# Patient Record
Sex: MALE | Race: AMERICAN INDIAN OR ALASKA NATIVE
[De-identification: names, ages, dates, MRNs, and addresses within clinical notes are randomized per-mention and may not be internally consistent; named-entity substitution may affect disease eponyms.]

---

## 2017-06-19 ENCOUNTER — HOSPITAL ENCOUNTER (OUTPATIENT)
Dept: HOSPITAL 31 - C.SDS | Age: 67
Discharge: HOME | End: 2017-06-19
Attending: UROLOGY
Payer: MEDICARE

## 2017-06-19 VITALS — BODY MASS INDEX: 25.8 KG/M2

## 2017-06-19 VITALS
HEART RATE: 59 BPM | SYSTOLIC BLOOD PRESSURE: 143 MMHG | TEMPERATURE: 98 F | DIASTOLIC BLOOD PRESSURE: 95 MMHG | OXYGEN SATURATION: 100 % | RESPIRATION RATE: 20 BRPM

## 2017-06-19 DIAGNOSIS — N39.0: ICD-10-CM

## 2017-06-19 DIAGNOSIS — N40.1: Primary | ICD-10-CM

## 2017-06-19 DIAGNOSIS — R39.14: ICD-10-CM

## 2017-06-19 PROCEDURE — 88104 CYTOPATH FL NONGYN SMEARS: CPT

## 2017-06-19 PROCEDURE — 87086 URINE CULTURE/COLONY COUNT: CPT

## 2017-06-19 PROCEDURE — 52000 CYSTOURETHROSCOPY: CPT

## 2017-06-19 PROCEDURE — 74430 CONTRAST X-RAY BLADDER: CPT

## 2017-06-19 NOTE — PCM.SURG1
Surgeon's Initial Post Op Note





- Surgeon's Notes


Surgeon: OTTO HAN


Assistant: NONE


Type of Anesthesia: General Mask


Pre-Operative Diagnosis: VOIDING DYSFUNCTION.  BPH


Operative Findings: SAME


Post-Operative Diagnosis: SAME


Operation Performed: CMG, CGM. CYSTOSCOPY


Specimen/Specimens Removed: URINE


Estimated Blood Loss: EBL {In ML}: 0


Blood Products Given: N/A


Drains Used: No Drains


Post-Op Condition: Good


Date of Surgery/Procedure: 06/19/17


Time of Surgery/Procedure: 10:10

## 2017-06-20 NOTE — RAD
PROCEDURE:  



HISTORY:

URINARY FREQUENCY



COMPARISON:

None



TECHNIQUE:

As per physician performing the exam



FINDINGS:

Contrast within a moderately distended bladder is noted wrote contour 

appears normal no gross filling defects seen



IMPRESSION:

As above

## 2017-06-20 NOTE — OP
PROCEDURE DATE: 06/19/2017



PREOPERATIVE DIAGNOSES:  Lower urinary tract voiding symptoms, prostatic enlargement.



POSTOPERATIVE DIAGNOSES:  Lower urinary tract voiding symptoms, prostatic enlargement.



PROCEDURE:  Cystometrogram.  Cystogram.  Cystoscopy.



DESCRIPTION OF PROCEDURE:  The patient received perioperative antibiotics.  The patient was placed in
 the supine position.  Akhtar catheter was inserted per urethra.  The residual within the bladder was 
less than 100 mL.  Urine was sent for bacteriologic examination and cytologic examination.



The cystometrogram was performed.  Carbon dioxide gas was instilled into the bladder at filling rate 
of 120 mL minute.



The patient had a first urge to void at a volume of 110 mL.  He had a stronger desire to void at 560 
mL.  The patient had felt uncomfortable and full at a volume of 200 mL.



There was no detrusor instability.  There was no detrusor contraction.  The only _____ pressure was w
hen patient was asked to cough.



The patient then had a cystogram performed.  Iodinated contrast dye was instilled per urethra.  Radio
graphs were obtained in PA and oblique views.



Cystogram demonstrated a normal bladder contour.  There was no intrinsic or extrinsic filling defect 
within the bladder.  There was no vesicoureteral reflux.  The bladder contour was normal.  There was 
mild elevation of the bladder base consistent with prostatic hypertrophy.



The bladder was then drained.  The patient was placed in lithotomy position.  Genitalia prepped and d
raped sterilely.  Anesthesia was provided by the anesthesiologist.



A 22-Portuguese cystoscope sheath was introduced under direct vision.  Urethra, prostate and bladder were
 inspected with 30-degree and 70-degree lenses.



FINDINGS:  There was no stricture of the anterior urethra.  There was evidence of predominant lateral
 lobe hypertrophy.  The prostatic urethra was 5 cm in length.  There was small middle lobe hypertroph
y at the bladder neck.



There was mild bladder trabeculation.  There was no bladder tumor.  There was no bladder stone.  The 
ureteral orifices were normal in position and shape.  There was no bladder diverticula.  There were n
o focal mucosal lesions within the bladder.



The bladder was reinspected with 70-degree lens and confirmed the above findings.



The cystoscope and sheath were removed.



Rectal examination was performed.  Prostate was supple and smooth, approximately 15 grams in size, wi
thout fixation, induration, or nodularity.  There was no abnormal pelvic mass fixation or induration.




The patient tolerated the procedure without complication.





__________________________________________

[place] EVANS Long MD







cc:



DD: 06/20/2017 06:23:52  [id number]

TT: 06/20/2017 11:48:13

Job # [id number]

rn

## 2018-11-10 ENCOUNTER — HOSPITAL ENCOUNTER (EMERGENCY)
Dept: HOSPITAL 31 - C.ER | Age: 68
Discharge: HOME | End: 2018-11-10
Payer: MEDICARE

## 2018-11-10 VITALS
DIASTOLIC BLOOD PRESSURE: 88 MMHG | HEART RATE: 81 BPM | SYSTOLIC BLOOD PRESSURE: 171 MMHG | TEMPERATURE: 97.8 F | OXYGEN SATURATION: 98 %

## 2018-11-10 VITALS — BODY MASS INDEX: 25.8 KG/M2

## 2018-11-10 VITALS — RESPIRATION RATE: 20 BRPM

## 2018-11-10 DIAGNOSIS — Z87.442: ICD-10-CM

## 2018-11-10 DIAGNOSIS — N18.9: ICD-10-CM

## 2018-11-10 DIAGNOSIS — N40.1: Primary | ICD-10-CM

## 2018-11-10 DIAGNOSIS — I12.9: ICD-10-CM

## 2018-11-10 DIAGNOSIS — R33.8: ICD-10-CM

## 2018-11-10 LAB
BILIRUB UR-MCNC: NEGATIVE MG/DL
GLUCOSE UR STRIP-MCNC: (no result) MG/DL
LEUKOCYTE ESTERASE UR-ACNC: (no result) LEU/UL
PH UR STRIP: 7 [PH] (ref 5–8)
PROT UR STRIP-MCNC: NEGATIVE MG/DL
RBC # UR STRIP: (no result) /UL
SP GR UR STRIP: 1.01 (ref 1–1.03)
SQUAMOUS EPITHIAL: < 1 /HPF (ref 0–5)
UROBILINOGEN UR-MCNC: NORMAL MG/DL (ref 0.2–1)

## 2018-11-10 NOTE — C.PDOC
History Of Present Illness





Patient c/o low abdominal pressure started from 11 am associated with inability 

to urinate. Patient sts he has h/o BPH and has scheduled surgery for 11/26/18 by

.


Chief Complaint (Nursing): Male Genitourinary


History Per: Patient


History/Exam Limitations: no limitations


Onset/Duration Of Symptoms: Days (1)


Current Symptoms Are (Timing): Still Present


Severity: Moderate


Quality Of Discomfort: Pressure





Past Medical History


Reviewed: Historical Data, Nursing Documentation, Vital Signs


Vital Signs: 





                                Last Vital Signs











Temp  97.8 F   11/10/18 03:00


 


Pulse  81   11/10/18 03:00


 


Resp  20   11/10/18 03:00


 


BP  171/88 H  11/10/18 03:00


 


Pulse Ox  98   11/10/18 03:00














- Medical History


PMH: Anxiety, Arthritis, HTN, Kidney Stones, Chronic Kidney Disease


Other PMH: BPH


Family History: States: No Known Family Hx





- Social History


Hx Alcohol Use: Yes


Hx Substance Use: No





- Immunization History


Hx Tetanus Toxoid Vaccination: No


Hx Influenza Vaccination: No


Hx Pneumococcal Vaccination: No





Review Of Systems


Except As Marked, All Systems Reviewed And Found Negative.





Physical Exam





- Physical Exam


Appears: Non-toxic, No Acute Distress, Other (uncomfortable)


Skin: Normal Color, Warm, No Rash


Head: Atraumatic, Normacephalic


Eye(s): bilateral: Normal Inspection


Chest: Symmetrical


Cardiovascular: Rhythm Regular


Respiratory: Normal Breath Sounds, No Accessory Muscle Use


Gastrointestinal/Abdominal: Tenderness (low abdomen), Distention (suprapubic 

area)


Extremity: Normal ROM, No Pedal Edema


Neurological/Psych: Oriented x3, Normal Cognition, Normal Motor, Normal 

Sensation





ED Course And Treatment


O2 Sat by Pulse Oximetry: 98


Progress Note: Akhtar was inserted by RN, obtained 500 ml of clear urine that was

sent for UA. Patient sts he feels much better. UA with few RBCs (probably 

traumatic from Akhtar insertion). No signs of UTI. Patient is stable to be d/c 

home with leg bag with  follow up.





Disposition





- Disposition


Referrals: 


Darwin Long MD [Staff Provider] - 


Aubree Long MD [Staff Provider] - 


Disposition: HOME/ ROUTINE


Disposition Time: 03:05


Condition: IMPROVED


Additional Instructions: 


Follow up with your Urologist  within 2-3 days. Return to Ed if feel 

worse.


Instructions:  Urinary Retention (DC)


Forms:  [facility] (English)





- Clinical Impression


Clinical Impression: 


 Urinary retention

## 2018-11-26 ENCOUNTER — HOSPITAL ENCOUNTER (INPATIENT)
Dept: HOSPITAL 31 - C.SDS | Age: 68
LOS: 3 days | Discharge: HOME | DRG: 713 | End: 2018-11-29
Attending: INTERNAL MEDICINE | Admitting: INTERNAL MEDICINE
Payer: MEDICARE

## 2018-11-26 VITALS — BODY MASS INDEX: 21.7 KG/M2

## 2018-11-26 DIAGNOSIS — F17.210: ICD-10-CM

## 2018-11-26 DIAGNOSIS — E11.22: ICD-10-CM

## 2018-11-26 DIAGNOSIS — I12.9: ICD-10-CM

## 2018-11-26 DIAGNOSIS — N18.9: ICD-10-CM

## 2018-11-26 DIAGNOSIS — N13.8: ICD-10-CM

## 2018-11-26 DIAGNOSIS — N40.1: Primary | ICD-10-CM

## 2018-11-26 DIAGNOSIS — Z79.4: ICD-10-CM

## 2018-11-26 LAB
BUN SERPL-MCNC: 18 MG/DL (ref 9–20)
CALCIUM SERPL-MCNC: 8.7 MG/DL (ref 8.6–10.4)
GFR NON-AFRICAN AMERICAN: > 60

## 2018-11-26 PROCEDURE — 0TJB8ZZ INSPECTION OF BLADDER, VIA NATURAL OR ARTIFICIAL OPENING ENDOSCOPIC: ICD-10-PCS | Performed by: UROLOGY

## 2018-11-26 PROCEDURE — 0VT08ZZ RESECTION OF PROSTATE, VIA NATURAL OR ARTIFICIAL OPENING ENDOSCOPIC: ICD-10-PCS | Performed by: UROLOGY

## 2018-11-26 RX ADMIN — OXYCODONE HYDROCHLORIDE AND ACETAMINOPHEN PRN TAB: 5; 325 TABLET ORAL at 18:00

## 2018-11-26 RX ADMIN — HYDROMORPHONE HYDROCHLORIDE PRN MG: 1 INJECTION, SOLUTION INTRAMUSCULAR; INTRAVENOUS; SUBCUTANEOUS at 15:36

## 2018-11-26 RX ADMIN — POTASSIUM CHLORIDE, DEXTROSE MONOHYDRATE AND SODIUM CHLORIDE SCH MLS: 150; 5; 450 INJECTION, SOLUTION INTRAVENOUS at 15:15

## 2018-11-26 RX ADMIN — INSULIN ASPART SCH: 100 INJECTION, SOLUTION INTRAVENOUS; SUBCUTANEOUS at 21:59

## 2018-11-26 RX ADMIN — HYDROMORPHONE HYDROCHLORIDE PRN MG: 1 INJECTION, SOLUTION INTRAMUSCULAR; INTRAVENOUS; SUBCUTANEOUS at 10:47

## 2018-11-26 RX ADMIN — HYDROMORPHONE HYDROCHLORIDE PRN MG: 1 INJECTION, SOLUTION INTRAMUSCULAR; INTRAVENOUS; SUBCUTANEOUS at 09:45

## 2018-11-26 RX ADMIN — POTASSIUM CHLORIDE, DEXTROSE MONOHYDRATE AND SODIUM CHLORIDE SCH: 150; 5; 450 INJECTION, SOLUTION INTRAVENOUS at 22:37

## 2018-11-26 RX ADMIN — INSULIN ASPART SCH UNIT: 100 INJECTION, SOLUTION INTRAVENOUS; SUBCUTANEOUS at 18:55

## 2018-11-26 NOTE — CP.PCM.HP
Past Patient History





- Infectious Disease


Hx of Infectious Diseases: None





- Past Medical History & Family History


Past Medical History?: Yes





- Past Social History


Smoking Status: Light Smoker < 10 Cigarettes Daily





- CARDIAC


Hx Cardiac Disorders: Yes


Hx Hypertension: Yes





- PULMONARY


Hx Respiratory Disorders: No





- NEUROLOGICAL


Hx Neurological Disorder: No





- HEENT


Hx HEENT Problems: No





- RENAL


Hx Chronic Kidney Disease: Yes


Hx Kidney Stones: Yes





- ENDOCRINE/METABOLIC


Hx Endocrine Disorders: Yes


Hx Diabetes Mellitus Type 2: Yes





- HEMATOLOGICAL/ONCOLOGICAL


Hx Blood Disorders: No





- INTEGUMENTARY


Hx Dermatological Problems: Yes (ITCHING/DERMATITIS LEFT ANKLE)





- MUSCULOSKELETAL/RHEUMATOLOGICAL


Hx Musculoskeletal Disorders: Yes (PAIN RT KNEE)


Hx Arthritis: Yes





- GASTROINTESTINAL


Other/Comment: ABD DISTENDED BUT SOFT NO PAIN





- GENITOURINARY/GYNECOLOGICAL


Hx Genitourinary Disorders: Yes (RETENTION)


Hx Prostate Problems: Yes


Hx Urinary Tract Infection: Yes





- PSYCHIATRIC


Hx Psychophysiologic Disorder: Yes


Hx Anxiety: Yes


Hx Substance Use: No





- SURGICAL HISTORY


Hx Surgeries: No


Other/Comment: CYSTO ? [facility]





- ANESTHESIA


Hx Anesthesia: Yes


Hx Anesthesia Reactions: No


Hx Malignant Hyperthermia: No


Has any member of the family had a problem w/ anesthesia?: No





Meds


Allergies/Adverse Reactions: 


                                    Allergies











Allergy/AdvReac Type Severity Reaction Status Date / Time


 


No Known Allergies Allergy   Verified 11/10/18 01:29














Physical Exam





- Constitutional


Appears: Well





- Head Exam


Head Exam: ATRAUMATIC, NORMAL INSPECTION, NORMOCEPHALIC





- Eye Exam


Eye Exam: EOMI, Normal appearance, PERRL


Pupil Exam: NORMAL ACCOMODATION, PERRL





- ENT Exam


ENT Exam: Mucous Membranes Moist, Normal Exam





- Neck Exam


Neck exam: Positive for: Normal Inspection





- Respiratory Exam


Respiratory Exam: Decreased Breath Sounds





- Cardiovascular Exam


Cardiovascular Exam: REGULAR RHYTHM, +S1, +S2





- GI/Abdominal Exam


GI & Abdominal Exam: Diminished Bowel Sounds, Soft





- Rectal Exam


Rectal Exam: Deferred





Results





- Vital Signs


Recent Vital Signs: 





                                Last Vital Signs











Temp  98 F   11/26/18 21:00


 


Pulse  72   11/26/18 21:00


 


Resp  20   11/26/18 21:00


 


BP  169/89 H  11/26/18 21:00


 


Pulse Ox  98   11/26/18 21:00














- Labs


Result Diagrams: 


                                 11/26/18 07:41


Labs: 





                         Laboratory Results - last 24 hr











  11/26/18 11/26/18 11/26/18





  07:05 07:41 11:32


 


Sodium   138 


 


Potassium   4.3 


 


Chloride   104 


 


Carbon Dioxide   25 


 


Anion Gap   14 


 


BUN   18 


 


Creatinine   1.0 


 


Est GFR ( Amer)   > 60 


 


Est GFR (Non-Af Amer)   > 60 


 


POC Glucose (mg/dL)  131 H   149 H


 


Random Glucose   150 H 


 


Calcium   8.7 














  11/26/18 11/26/18





  16:46 21:25


 


Sodium  


 


Potassium  


 


Chloride  


 


Carbon Dioxide  


 


Anion Gap  


 


BUN  


 


Creatinine  


 


Est GFR ( Amer)  


 


Est GFR (Non-Af Amer)  


 


POC Glucose (mg/dL)  303 H  165 H


 


Random Glucose  


 


Calcium

## 2018-11-26 NOTE — PCM.SURG1
Surgeon's Initial Post Op Note





- Surgeon's Notes


Surgeon: OTTO Long


Assistant: none


Type of Anesthesia: General LMA


Pre-Operative Diagnosis: BPH


Operative Findings: same


Post-Operative Diagnosis: same


Operation Performed: cysto,.  TURP


Specimen/Specimens Removed: urine.  prostate


Estimated Blood Loss: EBL {In ML}: 100


Blood Products Given: N/A


Post-Op Condition: Good


Date of Surgery/Procedure: 11/26/18


Time of Surgery/Procedure: 09:30

## 2018-11-27 LAB
BUN SERPL-MCNC: 13 MG/DL (ref 9–20)
CALCIUM SERPL-MCNC: 8 MG/DL (ref 8.6–10.4)
ERYTHROCYTE [DISTWIDTH] IN BLOOD BY AUTOMATED COUNT: 13.6 % (ref 11.5–14.5)
GFR NON-AFRICAN AMERICAN: > 60
HGB BLD-MCNC: 12.8 G/DL (ref 12–18)
MCH RBC QN AUTO: 31.8 PG (ref 27–31)
MCHC RBC AUTO-ENTMCNC: 34.5 G/DL (ref 33–37)
MCV RBC AUTO: 92.2 FL (ref 80–94)
PLATELET # BLD: 199 K/UL (ref 130–400)
PMV BLD AUTO: 8.6 FL (ref 7.2–11.7)
RBC # BLD AUTO: 4.02 MIL/UL (ref 4.4–5.9)
WBC # BLD AUTO: 6.8 K/UL (ref 4.8–10.8)

## 2018-11-27 RX ADMIN — INSULIN ASPART SCH UNIT: 100 INJECTION, SOLUTION INTRAVENOUS; SUBCUTANEOUS at 12:09

## 2018-11-27 RX ADMIN — OXYCODONE HYDROCHLORIDE AND ACETAMINOPHEN PRN TAB: 5; 325 TABLET ORAL at 04:12

## 2018-11-27 RX ADMIN — OXYCODONE HYDROCHLORIDE AND ACETAMINOPHEN PRN TAB: 5; 325 TABLET ORAL at 00:30

## 2018-11-27 RX ADMIN — OXYCODONE HYDROCHLORIDE AND ACETAMINOPHEN PRN TAB: 5; 325 TABLET ORAL at 07:57

## 2018-11-27 RX ADMIN — INSULIN ASPART SCH: 100 INJECTION, SOLUTION INTRAVENOUS; SUBCUTANEOUS at 16:35

## 2018-11-27 RX ADMIN — OXYCODONE HYDROCHLORIDE AND ACETAMINOPHEN PRN TAB: 5; 325 TABLET ORAL at 13:43

## 2018-11-27 RX ADMIN — INSULIN ASPART SCH UNIT: 100 INJECTION, SOLUTION INTRAVENOUS; SUBCUTANEOUS at 08:20

## 2018-11-27 RX ADMIN — OXYCODONE HYDROCHLORIDE AND ACETAMINOPHEN PRN TAB: 5; 325 TABLET ORAL at 18:21

## 2018-11-27 RX ADMIN — INSULIN ASPART SCH: 100 INJECTION, SOLUTION INTRAVENOUS; SUBCUTANEOUS at 21:27

## 2018-11-27 RX ADMIN — POTASSIUM CHLORIDE, DEXTROSE MONOHYDRATE AND SODIUM CHLORIDE SCH MLS/HR: 150; 5; 450 INJECTION, SOLUTION INTRAVENOUS at 05:48

## 2018-11-27 NOTE — OP
PROCEDURE DATE:  11/26/2018



PREOPERATIVE DIAGNOSES:

1.  Benign prostatic hypertrophy.

2.  Bladder outlet obstruction.

3.  Enlarged prostate.



POSTOPERATIVE DIAGNOSES:

1.  Benign prostatic hypertrophy.

2.  Bladder outlet obstruction.

3.  Enlarged prostate.



PROCEDURES:

1.  Cystoscopy.

2.  Transurethral resection of the prostate.



OPERATING SURGEON:  Aubree Long MD



DESCRIPTION OF PROCEDURE:  As follows:  The patient was placed in lithotomy

position.  The patient received general anesthesia.  The genitalia were

prepped and draped sterilely.  Perioperative antibiotics were administered.



A 26-Kazakh continuous flow resectoscope sheath was introduced under direct

vision.  Procedure was performed under video endoscopic control.



The urethra, prostate, and bladder were inspected with 30-degree lens.



FINDINGS:  There was no stricture in the anterior urethra.  There was

evidence of trilobar prostatic hypertrophy.  There was predominant lateral

lobe hypertrophy.  Prostatic urethra length was 4.5 cm.  There was a small

intravesical middle lobe as well.  There was moderate bladder

trabeculation.  The ureteral orifices were at first not seen due to middle

lobe hypertrophy.  Subsequent inspection identified the ureteral orifices

bilaterally.



The resectoscope was inserted.



Resection of the prostate was performed as follows.  The middle lobe at the

bladder neck was resected.  The ureteral orifices were identified and

spared throughout the procedure.



Hemostasis was achieved after each section of resection.



Thereafter, the anterior roof tissue was resected.  The landmarks of the

veru were identified and preserved throughout the resection.



Subsequently, the lateral lobes were resected, first on the right, then on

the left side.  Finally, the floor and apical prostatic tissue were

resected with the surgeon's index finger within the [employer] drape in the

rectum.



The prostatic chips were removed using the [employer] evacuator. 

The resectoscope was inserted.  Hemostasis was achieved.  There were no

residual prostatic chips.



The resectoscope and sheath were removed.  Akhtar catheter was inserted. 

Bladder drainage was clear with mild traction applied.



The patient was returned to supine position.



The patient tolerated the procedure without complication.  The patient was

transferred to recovery room in satisfactory condition.



__________________________________________

Aubree Long MD





cc:  Tam Cai MD



DD:  11/27/2018 6:52:17

DT:  11/27/2018 6:55:18

Job # [id number]

## 2018-11-27 NOTE — PCM.URO
Urology Progress Note





- General


General: Tolerating Diet





- Subjective


Abdominal Pain: No


Flank Pain: No


Nausea: No


Vomiting: No


Hematuria: Yes (mild, clears promptly)


Dsypnea: No


Chest Pain: No


Fever & Chills: No





- Objective


Lab Studies: Reviewed


Lab Results Last 24 Hours: 


                         Laboratory Results - last 24 hr











  11/26/18 11/26/18 11/26/18





  11:32 16:46 21:25


 


WBC   


 


RBC   


 


Hgb   


 


Hct   


 


MCV   


 


MCH   


 


MCHC   


 


RDW   


 


Plt Count   


 


MPV   


 


Sodium   


 


Potassium   


 


Chloride   


 


Carbon Dioxide   


 


Anion Gap   


 


BUN   


 


Creatinine   


 


Est GFR ( Amer)   


 


Est GFR (Non-Af Amer)   


 


POC Glucose (mg/dL)  149 H  303 H  165 H


 


Random Glucose   


 


Calcium   














  11/27/18 11/27/18 11/27/18





  06:33 06:44 06:44


 


WBC   6.8 


 


RBC   4.02 L 


 


Hgb   12.8 


 


Hct   37.1 


 


MCV   92.2 


 


MCH   31.8 H 


 


MCHC   34.5 


 


RDW   13.6 


 


Plt Count   199 


 


MPV   8.6 


 


Sodium    136


 


Potassium    4.1


 


Chloride    103


 


Carbon Dioxide    25


 


Anion Gap    11


 


BUN    13


 


Creatinine    1.0


 


Est GFR ( Amer)    > 60


 


Est GFR (Non-Af Amer)    > 60


 


POC Glucose (mg/dL)  154 H  


 


Random Glucose    155 H


 


Calcium    8.0 L











Intake & Output: 


                                 Intake & Output











 11/26/18 11/27/18 11/27/18





 18:59 06:59 18:59


 


Intake Total 1204 3450 


 


Output Total 600 2200 


 


Balance 604 1250 


 


Intake:   


 


  IV 1204  


 


  Intake, IV Amount  200 


 


    Left Antecubital  200 


 


  Oral  250 


 


  Other  3000 


 


Output:   


 


  Urine 600 2200 


 


    3-way Urethral  700 


 


Other:   


 


  Voiding Method  3-way Akhtar with CBI 











Vital Signs: 


                               Vital Signs - 24 hr











  11/26/18 11/26/18 11/26/18





  10:45 11:15 11:45


 


Temperature   


 


Pulse Rate 66 64 62


 


Pulse Rate [   





Radial]   


 


Respiratory 13 12 15





Rate   


 


Blood Pressure 140/88 139/84 


 


O2 Sat by Pulse 100 100 100





Oximetry   














  11/26/18 11/26/18 11/26/18





  12:30 13:00 14:00


 


Temperature   


 


Pulse Rate 60 62 62


 


Pulse Rate [   





Radial]   


 


Respiratory 12 11 L 12





Rate   


 


Blood Pressure   147/84


 


O2 Sat by Pulse 100 100 100





Oximetry   














  11/26/18 11/26/18 11/26/18





  15:00 15:36 16:00


 


Temperature   


 


Pulse Rate 62 60 60


 


Pulse Rate [   





Radial]   


 


Respiratory 13 13 12





Rate   


 


Blood Pressure 131/79  149/83


 


O2 Sat by Pulse 100 100 100





Oximetry   














  11/26/18 11/26/18 11/26/18





  18:00 18:30 19:00


 


Temperature   


 


Pulse Rate 60 65 63


 


Pulse Rate [   





Radial]   


 


Respiratory 12 12 14





Rate   


 


Blood Pressure 161/85 H 169/98 H 138/83


 


O2 Sat by Pulse 100 100 100





Oximetry   














  11/26/18 11/26/18 11/26/18





  20:15 21:00 23:35


 


Temperature 99 F 98 F 98 F


 


Pulse Rate 69 72 74


 


Pulse Rate [  72 





Radial]   


 


Respiratory 14 20 20





Rate   


 


Blood Pressure 137/81 169/89 H 192/100 H


 


O2 Sat by Pulse 100 98 95





Oximetry   














  11/27/18 11/27/18 11/27/18





  03:30 04:30 07:00


 


Temperature  97.7 F 97.9 F


 


Pulse Rate 76 64 87


 


Pulse Rate [   





Radial]   


 


Respiratory  20 20





Rate   


 


Blood Pressure 175/89 H 146/81 197/101 H


 


O2 Sat by Pulse  97 97





Oximetry   














- Physical Exam


Abdominal Exam: Soft, Non-Tender, Non-Distended


Back: No CVA Tenderness


Genitalia: Without Inflammation


Urinary Catheter Draining Well: Yes


Urine Color: Pink


Extremities: Normal: Bilateral





- Male


Phallus: Normal


Scrotum: Normal


Testes: Normal: Bilateral





- Plan


Advance Diet: Yes


Catheter Care: Yes


Ambulation - Out of Bed: Yes


Intake & Output: Yes


See Orders: Yes


Additional Information: IMP:  progressing well, p TURP





- Date & Time of Note


Date: 11/27/18


Time: 08:10

## 2018-11-27 NOTE — CP.PCM.PN
Subjective





- Date & Time of Evaluation


Date of Evaluation: 11/27/18


Time of Evaluation: 10:00





- Subjective


Subjective: 


clinically same





Objective





- Vital Signs/Intake and Output


Vital Signs (last 24 hours): 


                                        











Temp Pulse Resp BP Pulse Ox


 


 97.9 F   87   20   197/101 H  97 


 


 11/27/18 07:00  11/27/18 07:00  11/27/18 07:00  11/27/18 07:00  11/27/18 07:00








Intake and Output: 


                                        











 11/27/18 11/27/18





 06:59 18:59


 


Intake Total 3450 


 


Output Total 2200 


 


Balance 1250 














- Medications


Medications: 


                               Current Medications





Docusate Sodium (Colace)  100 mg PO TID [facility]


   Last Admin: 11/27/18 09:51 Dose:  100 mg


Ceftriaxone Sodium 1 gm/ (Sodium Chloride)  100 mls @ 100 mls/hr IVPB DAILY [facility];

Protocol


   Last Admin: 11/27/18 09:58 Dose:  100 mls/hr


Influenza Virus Vaccine (Fluzone Quad [id number])  60 mcg IM .ONCE ONE


   Stop: 11/28/18 10:01


Insulin Aspart (Novolog)  0 unit SC ACHS [facility]; Protocol


   Last Admin: 11/27/18 12:09 Dose:  2 unit


Labetalol HCl (Trandate)  5 mg IV Q15MIN [facility]


   Stop: 11/30/18 09:53


Losartan Potassium (Cozaar)  100 mg PO DAILY [facility]


   Last Admin: 11/27/18 09:51 Dose:  100 mg


Oxycodone/Acetaminophen (Percocet 5/325 Mg Tab)  1 tab PO Q4H PRN


   PRN Reason: Pain, moderate (4-7)


   Stop: 11/29/18 10:59


   Last Admin: 11/27/18 07:57 Dose:  1 tab


Pneumococcal Polyvalent Vaccine (Pneumovax 23 Vaccine)  0.5 ml IM .ONCE ONE


   Stop: 11/28/18 10:01


Tamsulosin HCl (Flomax)  0.4 mg PO BID [facility]


   Last Admin: 11/27/18 09:51 Dose:  0.4 mg











- Labs


Labs: 


                                        





                                 11/27/18 06:44 





                                 11/27/18 06:44 











- Constitutional


Appears: Well





- Head Exam


Head Exam: ATRAUMATIC, NORMAL INSPECTION, NORMOCEPHALIC





- Eye Exam


Eye Exam: EOMI, Normal appearance, PERRL


Pupil Exam: NORMAL ACCOMODATION, PERRL





- ENT Exam


ENT Exam: Mucous Membranes Moist, Normal Exam





- Neck Exam


Neck Exam: Full ROM, Normal Inspection.  absent: Lymphadenopathy





- Respiratory Exam


Respiratory Exam: Decreased Breath Sounds





- Cardiovascular Exam


Cardiovascular Exam: REGULAR RHYTHM, +S1, +S2





- GI/Abdominal Exam


GI & Abdominal Exam: Soft, Diminished Bowel Sounds





- Rectal Exam


Rectal Exam: Deferred

## 2018-11-28 VITALS — RESPIRATION RATE: 20 BRPM

## 2018-11-28 RX ADMIN — INSULIN ASPART SCH: 100 INJECTION, SOLUTION INTRAVENOUS; SUBCUTANEOUS at 21:22

## 2018-11-28 RX ADMIN — OXYCODONE HYDROCHLORIDE AND ACETAMINOPHEN PRN TAB: 5; 325 TABLET ORAL at 18:21

## 2018-11-28 RX ADMIN — MULTIPLE VITAMINS W/ MINERALS TAB SCH TAB: TAB at 09:34

## 2018-11-28 RX ADMIN — INSULIN ASPART SCH UNIT: 100 INJECTION, SOLUTION INTRAVENOUS; SUBCUTANEOUS at 12:00

## 2018-11-28 RX ADMIN — INSULIN ASPART SCH: 100 INJECTION, SOLUTION INTRAVENOUS; SUBCUTANEOUS at 16:19

## 2018-11-28 RX ADMIN — INSULIN ASPART SCH: 100 INJECTION, SOLUTION INTRAVENOUS; SUBCUTANEOUS at 07:30

## 2018-11-28 RX ADMIN — OXYCODONE HYDROCHLORIDE AND ACETAMINOPHEN PRN TAB: 5; 325 TABLET ORAL at 02:37

## 2018-11-28 NOTE — CP.PCM.PN
Subjective





- Date & Time of Evaluation


Date of Evaluation: 11/28/18


Time of Evaluation: 09:10





- Subjective


Subjective: 


clinically same





Objective





- Vital Signs/Intake and Output


Vital Signs (last 24 hours): 


                                        











Temp Pulse Resp BP Pulse Ox


 


 98.6 F   80   20   130/74   97 


 


 11/28/18 15:34  11/28/18 15:34  11/28/18 15:34  11/28/18 15:34  11/28/18 15:34








Intake and Output: 


                                        











 11/28/18 11/29/18





 18:59 06:59


 


Intake Total 550 


 


Output Total 1000 


 


Balance -450 














- Medications


Medications: 


                               Current Medications





Ciprofloxacin (Cipro)  500 mg PO BID Pending [facility] to [facility]; Protocol


   Last Admin: 11/28/18 21:58 Dose:  500 mg


Docusate Sodium (Colace)  100 mg PO TID [facility] to [facility]


   Last Admin: 11/28/18 17:00 Dose:  100 mg


Insulin Aspart (Novolog)  0 unit SC ACHS [facility] to [facility]; Protocol


   Last Admin: 11/28/18 21:22 Dose:  Not Given


Labetalol HCl (Trandate)  5 mg IV Q15MIN [facility] to [facility]


   Stop: 11/30/18 09:53


Losartan Potassium (Cozaar)  100 mg PO DAILY [facility] to [facility]


   Last Admin: 11/28/18 09:34 Dose:  100 mg


Multivitamins/Minerals (Therapeutic-M Tab)  1 tab PO 0800 [facility]ending [facility] to [facility]


   Last Admin: 11/28/18 09:34 Dose:  1 tab


Oxycodone/Acetaminophen (Percocet 5/325 Mg Tab)  1 tab PO Q4H PRN


   PRN Reason: Pain, moderate (4-7)


   Stop: 11/29/18 10:59


   Last Admin: 11/28/18 18:21 Dose:  1 tab


Tamsulosin HCl (Flomax)  0.4 mg PO BID Pending [facility] to [facility]


   Last Admin: 11/28/18 17:00 Dose:  0.4 mg











- Labs


Labs: 


                                        





                                 11/27/18 06:44 





                                 11/27/18 06:44 











- Constitutional


Appears: Well





- Head Exam


Head Exam: ATRAUMATIC, NORMAL INSPECTION, NORMOCEPHALIC





- Eye Exam


Eye Exam: EOMI, Normal appearance, PERRL


Pupil Exam: NORMAL ACCOMODATION, PERRL





- ENT Exam


ENT Exam: Mucous Membranes Moist, Normal Exam





- Neck Exam


Neck Exam: Full ROM, Normal Inspection.  absent: Lymphadenopathy





- Respiratory Exam


Respiratory Exam: Decreased Breath Sounds





- Cardiovascular Exam


Cardiovascular Exam: REGULAR RHYTHM, +S1, +S2





- GI/Abdominal Exam


GI & Abdominal Exam: Soft, Diminished Bowel Sounds





- Rectal Exam


Rectal Exam: Deferred

## 2018-11-29 VITALS — HEART RATE: 77 BPM | SYSTOLIC BLOOD PRESSURE: 132 MMHG | DIASTOLIC BLOOD PRESSURE: 82 MMHG

## 2018-11-29 VITALS — OXYGEN SATURATION: 98 % | TEMPERATURE: 97.8 F

## 2018-11-29 RX ADMIN — MULTIPLE VITAMINS W/ MINERALS TAB SCH TAB: TAB at 09:00

## 2018-11-29 RX ADMIN — INSULIN ASPART SCH UNIT: 100 INJECTION, SOLUTION INTRAVENOUS; SUBCUTANEOUS at 12:21

## 2018-11-29 RX ADMIN — OXYCODONE HYDROCHLORIDE AND ACETAMINOPHEN PRN TAB: 5; 325 TABLET ORAL at 05:33

## 2018-11-29 RX ADMIN — OXYCODONE HYDROCHLORIDE AND ACETAMINOPHEN PRN TAB: 5; 325 TABLET ORAL at 00:00

## 2018-11-29 RX ADMIN — INSULIN ASPART SCH: 100 INJECTION, SOLUTION INTRAVENOUS; SUBCUTANEOUS at 07:44

## 2018-11-29 NOTE — CP.PCM.PN
Subjective





- Date & Time of Evaluation


Date of Evaluation: 11/29/18


Time of Evaluation: 08:00





- Subjective


Subjective: 





Medicine progress note: Dr. Yue Heredia's service





Patient was seen and examined at bedside in the AM.  Patient states he has slig

ht burning when the urine is being released but otherwise is feeling well.  

Patient denies chest pain, shortness of breath, nausea, vomiting, diarrhea, 

constipation, hematuria, abdominal pain or headache. 





Objective





- Vital Signs/Intake and Output


Vital Signs (last 24 hours): 


                                        











Temp Pulse Resp BP Pulse Ox


 


 97.8 F   77   20   132/82   98 


 


 11/29/18 07:07  11/29/18 09:30  11/29/18 07:07  11/29/18 09:30  11/29/18 07:07








Intake and Output: 


                                        











 11/29/18 11/29/18





 06:59 18:59


 


Intake Total 500 


 


Output Total 1100 


 


Balance -600 














- Medications


Medications: 


                               Current Medications





Ciprofloxacin (Cipro)  500 mg PO BID [facility]; Protocol


   Last Admin: 11/29/18 09:33 Dose:  500 mg


Docusate Sodium (Colace)  100 mg PO TID [facility]


   Last Admin: 11/29/18 09:32 Dose:  100 mg


Insulin Aspart (Novolog)  0 unit SC [facility]; Protocol


   Last Admin: 11/29/18 07:44 Dose:  Not Given


Labetalol HCl (Trandate)  5 mg IV Q15MIN [facility]


   Stop: 11/30/18 09:53


Losartan Potassium (Cozaar)  100 mg PO DAILY [facility]


   Last Admin: 11/29/18 09:33 Dose:  100 mg


Multivitamins/Minerals (Therapeutic-M Tab)  1 tab PO 0800 [facility]


   Last Admin: 11/29/18 09:00 Dose:  1 tab


Tamsulosin HCl (Flomax)  0.4 mg PO BID [facility]


   Last Admin: 11/29/18 09:32 Dose:  0.4 mg











- Labs


Labs: 


                                        





                                 11/27/18 06:44 





                                 11/27/18 06:44 











- Constitutional


Appears: No Acute Distress





- Head Exam


Head Exam: ATRAUMATIC, NORMAL INSPECTION





- Eye Exam


Eye Exam: EOMI, Normal appearance





- ENT Exam


ENT Exam: Mucous Membranes Moist





- Respiratory Exam


Respiratory Exam: Clear to Ausculation Bilateral, NORMAL BREATHING PATTERN





- Cardiovascular Exam


Cardiovascular Exam: REGULAR RHYTHM, +S1, +S2





- GI/Abdominal Exam


GI & Abdominal Exam: Soft, Normal Bowel Sounds.  absent: Tenderness





-  Exam


Additional comments: 





enciso in place - urine - clear/yellow in color





- Extremities Exam


Extremities Exam: Normal Inspection





- Neurological Exam


Neurological Exam: Alert, Awake, Oriented x3





- Psychiatric Exam


Psychiatric exam: Normal Affect





- Skin


Skin Exam: Normal Color





Assessment and Plan





- Assessment and Plan (Free Text)


Assessment: 





BPH/Bladder Outlet Obstruction


- Uro Consult: Dr. Aubree Long --> help appreciated


   - s/p cystoscopy and transurethral resection of the prostate


- Discussed with Dr. Long - stated the burning is normal. Patient to be 

discharged with Percocet as needed for pain and Levaquin for 10 days.


Discussed with patient that he will go home with the enciso and is to follow up 

with Dr. Long in the office on Tuesday December 4th to have the enciso 

removed.  Patient stated he understood.





Case discussed with Dr. DANUTA Heredia

## 2019-01-31 ENCOUNTER — HOSPITAL ENCOUNTER (INPATIENT)
Dept: HOSPITAL 31 - C.ER | Age: 69
LOS: 4 days | Discharge: HOME | DRG: 639 | End: 2019-02-04
Attending: INTERNAL MEDICINE | Admitting: INTERNAL MEDICINE
Payer: MEDICARE

## 2019-01-31 VITALS — RESPIRATION RATE: 20 BRPM

## 2019-01-31 VITALS — BODY MASS INDEX: 21.7 KG/M2

## 2019-01-31 DIAGNOSIS — Z87.442: ICD-10-CM

## 2019-01-31 DIAGNOSIS — L30.9: ICD-10-CM

## 2019-01-31 DIAGNOSIS — E86.0: ICD-10-CM

## 2019-01-31 DIAGNOSIS — Z79.84: ICD-10-CM

## 2019-01-31 DIAGNOSIS — I12.9: ICD-10-CM

## 2019-01-31 DIAGNOSIS — N18.9: ICD-10-CM

## 2019-01-31 DIAGNOSIS — F41.9: ICD-10-CM

## 2019-01-31 DIAGNOSIS — F17.200: ICD-10-CM

## 2019-01-31 DIAGNOSIS — Z91.14: ICD-10-CM

## 2019-01-31 DIAGNOSIS — E11.65: Primary | ICD-10-CM

## 2019-01-31 DIAGNOSIS — R35.0: ICD-10-CM

## 2019-01-31 DIAGNOSIS — E11.22: ICD-10-CM

## 2019-01-31 LAB
ALBUMIN SERPL-MCNC: 4.1 G/DL (ref 3.5–5)
ALBUMIN/GLOB SERPL: 1.1 {RATIO} (ref 1–2.1)
ALT SERPL-CCNC: 23 U/L (ref 21–72)
APTT BLD: 29 SECONDS (ref 21–34)
AST SERPL-CCNC: 22 U/L (ref 17–59)
BACTERIA #/AREA URNS HPF: (no result) /[HPF]
BASE EXCESS BLDV CALC-SCNC: -3.1 MMOL/L (ref 0–2)
BASOPHILS # BLD AUTO: 0 K/UL (ref 0–0.2)
BASOPHILS NFR BLD: 0.4 % (ref 0–2)
BILIRUB UR-MCNC: NEGATIVE MG/DL
BUN SERPL-MCNC: 16 MG/DL (ref 9–20)
CALCIUM SERPL-MCNC: 9.2 MG/DL (ref 8.6–10.4)
EOSINOPHIL # BLD AUTO: 0.1 K/UL (ref 0–0.7)
EOSINOPHIL NFR BLD: 1.7 % (ref 0–4)
ERYTHROCYTE [DISTWIDTH] IN BLOOD BY AUTOMATED COUNT: 13 % (ref 11.5–14.5)
GFR NON-AFRICAN AMERICAN: > 60
GLUCOSE UR STRIP-MCNC: (no result) MG/DL
HGB BLD-MCNC: 15.1 G/DL (ref 12–18)
INR PPP: 1.2
LEUKOCYTE ESTERASE UR-ACNC: (no result) LEU/UL
LYMPHOCYTES # BLD AUTO: 1.1 K/UL (ref 1–4.3)
LYMPHOCYTES NFR BLD AUTO: 23.7 % (ref 20–40)
MCH RBC QN AUTO: 31.6 PG (ref 27–31)
MCHC RBC AUTO-ENTMCNC: 33.9 G/DL (ref 33–37)
MCV RBC AUTO: 93.2 FL (ref 80–94)
MONOCYTES # BLD: 0.2 K/UL (ref 0–0.8)
MONOCYTES NFR BLD: 4.6 % (ref 0–10)
NEUTROPHILS # BLD: 3.1 K/UL (ref 1.8–7)
NEUTROPHILS NFR BLD AUTO: 69.6 % (ref 50–75)
NRBC BLD AUTO-RTO: 0 % (ref 0–2)
PCO2 BLDV: 39 MMHG (ref 40–60)
PH BLDV: 7.36 [PH] (ref 7.32–7.43)
PH UR STRIP: 5 [PH] (ref 5–8)
PLATELET # BLD: 206 K/UL (ref 130–400)
PMV BLD AUTO: 10.1 FL (ref 7.2–11.7)
PROT UR STRIP-MCNC: NEGATIVE MG/DL
PROTHROMBIN TIME: 13 SECONDS (ref 9.7–12.2)
RBC # BLD AUTO: 4.77 MIL/UL (ref 4.4–5.9)
RBC # UR STRIP: (no result) /UL
SP GR UR STRIP: 1.03 (ref 1–1.03)
SQUAMOUS EPITHIAL: 1 /HPF (ref 0–5)
UROBILINOGEN UR-MCNC: NORMAL MG/DL (ref 0.2–1)
VENOUS BLOOD GAS PO2: 45 MM/HG (ref 30–55)
WBC # BLD AUTO: 4.4 K/UL (ref 4.8–10.8)

## 2019-01-31 RX ADMIN — HUMAN INSULIN SCH: 100 INJECTION, SOLUTION SUBCUTANEOUS at 22:26

## 2019-01-31 NOTE — C.PDOC
History Of Present Illness


67yo male with history of diabetes, hypertension, non-compliant with 

medications, comes to ER after he was at an outpatient office and his labs 

indicated elevated blood sugar. Patient reports polyuria, polydypsia as well. No

fever, chills or complaints of pain.


Time Seen by Provider: 19 12:04


Chief Complaint (Nursing): High Blood Sugar


History Per: Patient


History/Exam Limitations: no limitations





Past Medical History


Reviewed: Historical Data, Nursing Documentation, Vital Signs


Vital Signs: 





                                Last Vital Signs











Temp  98.2 F   19 11:55


 


Pulse  100 H  19 11:55


 


Resp  20   19 11:55


 


BP  182/123 H  19 11:55


 


Pulse Ox  100   19 11:55














- Medical History


PMH: Anxiety, Arthritis, HTN, Kidney Stones, Chronic Kidney Disease


Surgical History: No Surg Hx





- [facility] Procedures











INSPECTION OF BLADDER, ENDO (18)


RESECTION OF PROSTATE, ENDO (18)








Family History: States: Unknown Family Hx





- Social History


Hx Alcohol Use: No


Hx Substance Use: No





- Immunization History


Hx Tetanus Toxoid Vaccination: No


Hx Influenza Vaccination: No


Hx Pneumococcal Vaccination: No





Review Of Systems


Except As Marked, All Systems Reviewed And Found Negative.


Constitutional: Positive for: Other (polydypsia).  Negative for: Fever, Chills


Cardiovascular: Negative for: Chest Pain


Gastrointestinal: Negative for: Vomiting, Abdominal Pain


Genitourinary: Positive for: Other (polyuria)





Physical Exam





- Physical Exam


Appears: Non-toxic, No Acute Distress


Skin: Normal Color


Head: Normacephalic


Eye(s): bilateral: Normal Inspection


Neck: Normal ROM, Supple


Chest: Symmetrical


Cardiovascular: Rhythm Regular


Respiratory: Normal Breath Sounds


Gastrointestinal/Abdominal: Normal Exam, Soft, No Tenderness


Back: Normal Inspection, No CVA Tenderness


Extremity: Normal ROM, No Pedal Edema


Neurological/Psych: Oriented x3





ED Course And Treatment





- Laboratory Results


Result Diagrams: 


                                 19 12:33





                                 19 12:33


ECG: Interpreted By Me, Viewed By Me


ECG Rhythm: Sinus Rhythm


ECG Interpretation: Normal


Rate From EC


O2 Sat by Pulse Oximetry: 100 (RA)


Pulse Ox Interpretation: Normal





Medical Decision Making


Medical Decision Makinyo male wit increased blood sugar; hx of HTN, DM


Plan: 


-- Labs


-- EKG


-- VBG


-- Urinalysis


-- IV Fluids








ro dka- labs no eo of dka. normal ph. noted ketones, suspect dehydration. bicarb

normal. ivf insulin initated. seen by dr wiseman bedside accepts for obs for 

hyperglycemia dehydration. 





Disposition





- Disposition


Disposition: HOSPITALIZED


Disposition Time: 14:34


Condition: STABLE





- Clinical Impression


Clinical Impression: 


 Hyperglycemia, Dehydration








- Scribe Statement


The provider has reviewed the documentation as recorded by the Meli Castillo


Provider Attestation: 


All medical record entries made by the Charanjitibxuan were at my direction and 

personally dictated by me. I have reviewed the chart and agree that the record 

accurately reflects my personal performance of the history, physical exam, 

medical decision making, and the department course for this patient. I have also

personally directed, reviewed, and agree with the discharge instructions and 

disposition.





Decision To Admit





- Pt Status Changed To:


Hospital Disposition Of: Observation





- .


Bed Request Type: Regular


Admitting Physician: Renee Wiseman


Patient Diagnosis: 


 Hyperglycemia, Dehydration

## 2019-02-01 RX ADMIN — PANTOPRAZOLE SODIUM SCH MG: 40 TABLET, DELAYED RELEASE ORAL at 09:19

## 2019-02-01 RX ADMIN — HUMAN INSULIN SCH UNITS: 100 INJECTION, SOLUTION SUBCUTANEOUS at 11:49

## 2019-02-01 RX ADMIN — ENOXAPARIN SODIUM SCH MG: 40 INJECTION SUBCUTANEOUS at 09:18

## 2019-02-01 RX ADMIN — HUMAN INSULIN SCH: 100 INJECTION, SOLUTION SUBCUTANEOUS at 22:00

## 2019-02-01 RX ADMIN — HUMAN INSULIN SCH UNITS: 100 INJECTION, SOLUTION SUBCUTANEOUS at 08:09

## 2019-02-01 RX ADMIN — HUMAN INSULIN SCH: 100 INJECTION, SOLUTION SUBCUTANEOUS at 11:42

## 2019-02-01 RX ADMIN — HUMAN INSULIN SCH UNITS: 100 INJECTION, SOLUTION SUBCUTANEOUS at 17:30

## 2019-02-01 NOTE — CARD
--------------- APPROVED REPORT --------------





Date of service: 01/31/2019



EKG Measurement

Heart [id number]

CT 142P40

[id number]

[id number]

[id number]



<Conclusion>

Normal sinus rhythm

Left axis deviation

Minimal voltage criteria for LVH, may be normal variant

Abnormal ECG

## 2019-02-01 NOTE — CP.PCM.HP
Past Patient History





- Infectious Disease


Hx of Infectious Diseases: None





- Past Medical History & Family History


Past Medical History?: Yes





- Past Social History


Smoking Status: Light Smoker < 10 Cigarettes Daily





- CARDIAC


Hx Hypertension: Yes





- PULMONARY


Hx Respiratory Disorders: No





- NEUROLOGICAL


Hx Neurological Disorder: No





- HEENT


Hx HEENT Problems: No





- RENAL


Hx Chronic Kidney Disease: Yes


Hx Kidney Stones: Yes





- ENDOCRINE/METABOLIC


Hx Endocrine Disorders: Yes


Hx Diabetes Mellitus Type 2: Yes





- HEMATOLOGICAL/ONCOLOGICAL


Hx Blood Disorders: No





- INTEGUMENTARY


Hx Dermatological Problems: Yes (ITCHING/DERMATITIS LEFT ANKLE)





- MUSCULOSKELETAL/RHEUMATOLOGICAL


Hx Arthritis: Yes





- GASTROINTESTINAL


Other/Comment: ABD DISTENDED BUT SOFT NO PAIN





- GENITOURINARY/GYNECOLOGICAL


Hx Genitourinary Disorders: Yes (RETENTION WIGGINS TO SGD)





- PSYCHIATRIC


Hx Anxiety: Yes


Hx Substance Use: No





- SURGICAL HISTORY


Hx Surgeries: Yes


Other/Comment: Prostate surgery





- ANESTHESIA


Hx Anesthesia: Yes


Hx Anesthesia Reactions: No


Hx Malignant Hyperthermia: No





Meds


Allergies/Adverse Reactions: 


                                    Allergies











Allergy/AdvReac Type Severity Reaction Status Date / Time


 


No Known Allergies Allergy   Verified 01/31/19 11:58














Physical Exam





- Constitutional


Appears: Well





- Head Exam


Head Exam: ATRAUMATIC, NORMAL INSPECTION, NORMOCEPHALIC





- Eye Exam


Eye Exam: EOMI, Normal appearance, PERRL


Pupil Exam: NORMAL ACCOMODATION, PERRL





- ENT Exam


ENT Exam: Mucous Membranes Moist, Normal Exam





- Neck Exam


Neck exam: Positive for: Normal Inspection





- Respiratory Exam


Respiratory Exam: Decreased Breath Sounds





- Cardiovascular Exam


Cardiovascular Exam: REGULAR RHYTHM, +S1, +S2





- GI/Abdominal Exam


GI & Abdominal Exam: Diminished Bowel Sounds, Soft





- Rectal Exam


Rectal Exam: Deferred





Results





- Vital Signs


Recent Vital Signs: 





                                Last Vital Signs











Temp  97.8 F   02/01/19 16:00


 


Pulse  79   02/01/19 16:00


 


Resp  20   02/01/19 16:00


 


BP  126/80   02/01/19 16:00


 


Pulse Ox  96   02/01/19 16:17














- Labs


Result Diagrams: 


                                 01/31/19 12:33





                                 01/31/19 12:33


Labs: 





                         Laboratory Results - last 24 hr











  01/31/19 02/01/19 02/01/19





  22:18 02:18 07:23


 


POC Glucose (mg/dL)  299 H  191 H  273 H














  02/01/19 02/01/19





  11:26 16:43


 


POC Glucose (mg/dL)  466 H*  249 H

## 2019-02-02 LAB
ALBUMIN SERPL-MCNC: 2.9 G/DL (ref 3.5–5)
ALBUMIN/GLOB SERPL: 1 {RATIO} (ref 1–2.1)
ALT SERPL-CCNC: 19 U/L (ref 21–72)
AST SERPL-CCNC: 17 U/L (ref 17–59)
BASOPHILS # BLD AUTO: 0 K/UL (ref 0–0.2)
BASOPHILS NFR BLD: 0.5 % (ref 0–2)
BUN SERPL-MCNC: 10 MG/DL (ref 9–20)
CALCIUM SERPL-MCNC: 8.1 MG/DL (ref 8.6–10.4)
EOSINOPHIL # BLD AUTO: 0.2 K/UL (ref 0–0.7)
EOSINOPHIL NFR BLD: 3.8 % (ref 0–4)
ERYTHROCYTE [DISTWIDTH] IN BLOOD BY AUTOMATED COUNT: 13.1 % (ref 11.5–14.5)
GFR NON-AFRICAN AMERICAN: > 60
HGB BLD-MCNC: 12.8 G/DL (ref 12–18)
LYMPHOCYTES # BLD AUTO: 2.1 K/UL (ref 1–4.3)
LYMPHOCYTES NFR BLD AUTO: 53.3 % (ref 20–40)
MCH RBC QN AUTO: 31.6 PG (ref 27–31)
MCHC RBC AUTO-ENTMCNC: 34 G/DL (ref 33–37)
MCV RBC AUTO: 92.9 FL (ref 80–94)
MONOCYTES # BLD: 0.2 K/UL (ref 0–0.8)
MONOCYTES NFR BLD: 6.1 % (ref 0–10)
NEUTROPHILS # BLD: 1.4 K/UL (ref 1.8–7)
NEUTROPHILS NFR BLD AUTO: 36.3 % (ref 50–75)
NRBC BLD AUTO-RTO: 0.1 % (ref 0–2)
PLATELET # BLD: 169 K/UL (ref 130–400)
PMV BLD AUTO: 9.7 FL (ref 7.2–11.7)
RBC # BLD AUTO: 4.05 MIL/UL (ref 4.4–5.9)
WBC # BLD AUTO: 4 K/UL (ref 4.8–10.8)

## 2019-02-02 RX ADMIN — HUMAN INSULIN SCH UNITS: 100 INJECTION, SOLUTION SUBCUTANEOUS at 17:10

## 2019-02-02 RX ADMIN — HUMAN INSULIN SCH UNITS: 100 INJECTION, SOLUTION SUBCUTANEOUS at 11:49

## 2019-02-02 RX ADMIN — HUMAN INSULIN SCH UNITS: 100 INJECTION, SOLUTION SUBCUTANEOUS at 07:43

## 2019-02-02 RX ADMIN — HUMAN INSULIN SCH: 100 INJECTION, SOLUTION SUBCUTANEOUS at 21:46

## 2019-02-02 RX ADMIN — ENOXAPARIN SODIUM SCH MG: 40 INJECTION SUBCUTANEOUS at 09:44

## 2019-02-02 RX ADMIN — PANTOPRAZOLE SODIUM SCH MG: 40 TABLET, DELAYED RELEASE ORAL at 09:44

## 2019-02-02 NOTE — CP.PCM.PN
Subjective





- Date & Time of Evaluation


Date of Evaluation: 02/02/19


Time of Evaluation: 08:00





- Subjective


Subjective: 


clinically same





Objective





- Vital Signs/Intake and Output


Vital Signs (last 24 hours): 


                                        











Temp Pulse Resp BP Pulse Ox


 


 97.8 F   69   20   137/85   95 


 


 02/02/19 07:46  02/02/19 07:46  02/02/19 07:46  02/02/19 07:46  02/02/19 07:46








Intake and Output: 


                                        











 02/02/19 02/02/19





 06:59 18:59


 


Intake Total 2100 


 


Balance 2100 














- Medications


Medications: 


                               Current Medications





Amlodipine Besylate (Norvasc)  10 mg PO DAILY [facility]


   Last Admin: 02/02/19 09:44 Dose:  10 mg


Enoxaparin Sodium (Lovenox)  40 mg SC DAILY [facility]


   Last Admin: 02/02/19 09:44 Dose:  40 mg


Sodium Chloride (Sodium Chloride 0.9%)  1,000 mls @ 100 mls/hr IV .Q10H [facility]


   Last Admin: 02/02/19 09:01 Dose:  Not Given


Influenza Virus Vaccine (Flucelvax Quad [id number] Syr)  60 mcg IM .ONCE ONE


   Stop: 02/03/19 10:01


Insulin Human Regular (Novolin R)  [facility]; Protocol


   Last Admin: 02/02/19 11:49 Dose:  8 units


Losartan Potassium (Cozaar)  100 mg PO DAILY [facility]


   Last Admin: 02/02/19 09:44 Dose:  100 mg


Metformin HCl (Glucophage)  500 mg PO BID [facility]


   Last Admin: 02/02/19 09:44 Dose:  500 mg


Pantoprazole Sodium (Protonix Ec Tab)  40 mg PO DAILY [facility]


   Last Admin: 02/02/19 09:44 Dose:  40 mg


Pneumococcal Polyvalent Vaccine (Pneumovax 23 Vaccine)  0.5 ml IM .ONCE ONE


   Stop: 02/03/19 10:01











- Labs


Labs: 


                                        





                                 02/02/19 06:36 





                                 02/02/19 06:36 





                                        











PT  13.0 SECONDS (9.7-12.2)  H  01/31/19  12:33    


 


INR  1.2   01/31/19  12:33    


 


APTT  29 SECONDS (21-34)   01/31/19  12:33    














- Constitutional


Appears: Well





- Head Exam


Head Exam: ATRAUMATIC, NORMAL INSPECTION, NORMOCEPHALIC





- Eye Exam


Eye Exam: EOMI, Normal appearance, PERRL


Pupil Exam: NORMAL ACCOMODATION, PERRL





- ENT Exam


ENT Exam: Mucous Membranes Moist, Normal Exam





- Neck Exam


Neck Exam: Full ROM, Normal Inspection.  absent: Lymphadenopathy





- Respiratory Exam


Respiratory Exam: Decreased Breath Sounds





- Cardiovascular Exam


Cardiovascular Exam: REGULAR RHYTHM, +S1, +S2





- GI/Abdominal Exam


GI & Abdominal Exam: Soft, Diminished Bowel Sounds





- Rectal Exam


Rectal Exam: Deferred

## 2019-02-03 RX ADMIN — HUMAN INSULIN SCH UNITS: 100 INJECTION, SOLUTION SUBCUTANEOUS at 17:10

## 2019-02-03 RX ADMIN — HUMAN INSULIN SCH: 100 INJECTION, SOLUTION SUBCUTANEOUS at 21:58

## 2019-02-03 RX ADMIN — PANTOPRAZOLE SODIUM SCH MG: 40 TABLET, DELAYED RELEASE ORAL at 09:13

## 2019-02-03 RX ADMIN — HUMAN INSULIN SCH UNITS: 100 INJECTION, SOLUTION SUBCUTANEOUS at 12:15

## 2019-02-03 RX ADMIN — HUMAN INSULIN SCH UNITS: 100 INJECTION, SOLUTION SUBCUTANEOUS at 08:12

## 2019-02-03 RX ADMIN — ENOXAPARIN SODIUM SCH MG: 40 INJECTION SUBCUTANEOUS at 09:13

## 2019-02-03 NOTE — CP.PCM.PN
Subjective





- Date & Time of Evaluation


Date of Evaluation: 02/03/19


Time of Evaluation: 08:00





- Subjective


Subjective: 


clinically same





Objective





- Vital Signs/Intake and Output


Vital Signs (last 24 hours): 


                                        











Temp Pulse Resp BP Pulse Ox


 


 98.5 F   105 H  20   141/83   96 


 


 02/03/19 15:00  02/03/19 15:00  02/03/19 15:00  02/03/19 15:00  02/03/19 15:00








Intake and Output: 


                                        











 02/03/19 02/03/19





 06:59 18:59


 


Intake Total 1150 


 


Balance 1150 














- Medications


Medications: 


                               Current Medications





Amlodipine Besylate (Norvasc)  10 mg PO DAILY [facility]


   Last Admin: 02/03/19 09:13 Dose:  10 mg


Enoxaparin Sodium (Lovenox)  40 mg SC DAILY [facility]


   Last Admin: 02/03/19 09:13 Dose:  40 mg


Sodium Chloride (Sodium Chloride 0.9%)  1,000 mls @ 100 mls/hr IV .Q10H [facility]


   Last Admin: 02/03/19 15:59 Dose:  Not Given


Insulin Human Regular (Novolin R)  0 unit SC [facility]; Protocol


   Last Admin: 02/03/19 12:15 Dose:  6 units


Losartan Potassium (Cozaar)  100 mg PO DAILY [facility]


   Last Admin: 02/03/19 09:13 Dose:  100 mg


Metformin HCl (Glucophage)  500 mg PO BID [facility]


   Last Admin: 02/03/19 09:13 Dose:  500 mg


Pantoprazole Sodium (Protonix Ec Tab)  40 mg PO DAILY [facility]


   Last Admin: 02/03/19 09:13 Dose:  40 mg











- Labs


Labs: 


                                        





                                 02/02/19 06:36 





                                 02/02/19 06:36 





                                        











PT  13.0 SECONDS (9.7-12.2)  H  01/31/19  12:33    


 


INR  1.2   01/31/19  12:33    


 


APTT  29 SECONDS (21-34)   01/31/19  12:33    














- Constitutional


Appears: Well





- Head Exam


Head Exam: ATRAUMATIC, NORMAL INSPECTION, NORMOCEPHALIC





- Eye Exam


Eye Exam: EOMI, Normal appearance, PERRL


Pupil Exam: NORMAL ACCOMODATION, PERRL





- ENT Exam


ENT Exam: Mucous Membranes Moist, Normal Exam





- Neck Exam


Neck Exam: Full ROM, Normal Inspection.  absent: Lymphadenopathy





- Respiratory Exam


Respiratory Exam: Decreased Breath Sounds





- Cardiovascular Exam


Cardiovascular Exam: REGULAR RHYTHM, +S1, +S2





- GI/Abdominal Exam


GI & Abdominal Exam: Soft, Diminished Bowel Sounds





- Rectal Exam


Rectal Exam: Deferred

## 2019-02-04 VITALS
TEMPERATURE: 98.1 F | OXYGEN SATURATION: 95 % | HEART RATE: 76 BPM | DIASTOLIC BLOOD PRESSURE: 95 MMHG | SYSTOLIC BLOOD PRESSURE: 153 MMHG

## 2019-02-04 LAB
ALBUMIN SERPL-MCNC: 3.3 G/DL (ref 3.5–5)
ALBUMIN/GLOB SERPL: 1.1 {RATIO} (ref 1–2.1)
ALT SERPL-CCNC: 53 U/L (ref 21–72)
AST SERPL-CCNC: 70 U/L (ref 17–59)
BUN SERPL-MCNC: 14 MG/DL (ref 9–20)
CALCIUM SERPL-MCNC: 8.6 MG/DL (ref 8.6–10.4)
GFR NON-AFRICAN AMERICAN: > 60

## 2019-02-04 RX ADMIN — ENOXAPARIN SODIUM SCH MG: 40 INJECTION SUBCUTANEOUS at 10:35

## 2019-02-04 RX ADMIN — HUMAN INSULIN SCH UNITS: 100 INJECTION, SOLUTION SUBCUTANEOUS at 08:38

## 2019-02-04 RX ADMIN — PANTOPRAZOLE SODIUM SCH MG: 40 TABLET, DELAYED RELEASE ORAL at 10:34

## 2019-02-04 NOTE — CP.PCM.PN
Subjective





- Date & Time of Evaluation


Date of Evaluation: 02/04/19


Time of Evaluation: 09:09





- Subjective


Subjective: 





Progress Note for Dr. Heredia





68 year old male with history of hypertension, BPH s/p TURP and diabetes was 

sent to the ED by his PMD for elevated blood surgar. At the time of admission 

patient's random blood glucose was 562 without ion gap. He complains of frequent

urination and feeling thirsty weeks before coming to the hospital. Patient 

reports to be compliant with home metformin, but does not check his blood 

glucose. Patient seen and examined at bedside this morning. No acute events 

overnight. Patient is resting in bed comfortably. Currently he denies fever, 

chills, dizziness, headache, shortness of breath, chest pain, nausea, vomiting 

or urinary symptoms.





PMD: Dr. Lomax


PMHx: HTN, DM


PSHx: TURP 11/2018


Allergy: NKDA


Social Hx: Current smoker (3-4 cigarettes daily x 30 years), social alcohol 

consumption, denies other drug use


Meds: Metformin, Losartan








Objective





- Vital Signs/Intake and Output


Vital Signs (last 24 hours): 


                                        











Temp Pulse Resp BP Pulse Ox


 


 97.8 F   75   20   159/80 H  95 


 


 02/04/19 00:00  02/04/19 00:00  02/04/19 00:00  02/04/19 00:00  02/04/19 00:00








Intake and Output: 


                                        











 02/04/19 02/04/19





 06:59 18:59


 


Intake Total 500 


 


Balance 500 














- Medications


Medications: 


                               Current Medications





Amlodipine Besylate (Norvasc)  10 mg PO DAILY [facility]


   Last Admin: 02/03/19 09:13 Dose:  10 mg


Enoxaparin Sodium (Lovenox)  40 mg SC DAILY [facility]


   Last Admin: 02/03/19 09:13 Dose:  40 mg


Insulin Human Regular (Novolin R)  [facility]; Protocol


   Last Admin: 02/04/19 08:38 Dose:  4 units


Losartan Potassium (Cozaar)  100 mg PO DAILY [facility]


   Last Admin: 02/03/19 09:13 Dose:  100 mg


Metformin HCl (Glucophage)  500 mg PO BID [facility]


   Last Admin: 02/03/19 17:43 Dose:  500 mg


Pantoprazole Sodium (Protonix Ec Tab)  40 mg PO DAILY [facility]


   Last Admin: 02/03/19 09:13 Dose:  40 mg











- Labs


Labs: 


                                        





                                 02/02/19 06:36 





                                 02/02/19 06:36 





                                        











PT  13.0 SECONDS (9.7-12.2)  H  01/31/19  12:33    


 


INR  1.2   01/31/19  12:33    


 


APTT  29 SECONDS (21-34)   01/31/19  12:33    














- Constitutional


Appears: Well, Non-toxic, No Acute Distress





- Head Exam


Head Exam: ATRAUMATIC, NORMAL INSPECTION, NORMOCEPHALIC





- Eye Exam


Eye Exam: EOMI, Normal appearance, PERRL


Pupil Exam: NORMAL ACCOMODATION, PERRL





- ENT Exam


ENT Exam: Mucous Membranes Moist, Normal Exam





- Neck Exam


Neck Exam: Full ROM, Normal Inspection





- Respiratory Exam


Respiratory Exam: Clear to Ausculation Bilateral, NORMAL BREATHING PATTERN.  

absent: Wheezes, Respiratory Distress





- Cardiovascular Exam


Cardiovascular Exam: REGULAR RHYTHM, +S1, +S2.  absent: Murmur





- GI/Abdominal Exam


GI & Abdominal Exam: Soft (but distended), Normal Bowel Sounds





- Extremities Exam


Extremities Exam: Full ROM, Normal Capillary Refill, Normal Inspection.  absent:

Joint Swelling, Pedal Edema





- Back Exam


Back Exam: NORMAL INSPECTION





- Neurological Exam


Neurological Exam: Alert, Awake, Oriented x3





- Psychiatric Exam


Psychiatric exam: Normal Affect, Normal Mood





- Skin


Skin Exam: Dry, Normal Color, Warm





Assessment and Plan





- Assessment and Plan (Free Text)


Assessment: 





Hyperglycemia


-secondary to uncontrolled diabetes


-blood glucose in the 200s since hospital admission


-B Hydroxybutyrate 1.68


-No ion gap


-Follow up A1C


-ISS


-FS ACHS


-CCD


-Metformin 1000mg bid





Hypertension


-Losartan 100mg daily


-Amlodipine 10mg daily





Tobacco Abuse


-smoking cessation was strongly advised


-Refused nicotine patch





Prophylactic measures


-Lovenox


-Protonix





Patient is medically stable to be discharged home


Case discussed with attending Dr. Heredia